# Patient Record
Sex: FEMALE
[De-identification: names, ages, dates, MRNs, and addresses within clinical notes are randomized per-mention and may not be internally consistent; named-entity substitution may affect disease eponyms.]

---

## 2020-05-18 ENCOUNTER — HOSPITAL ENCOUNTER (OUTPATIENT)
Dept: HOSPITAL 62 - OROUT | Age: 25
Discharge: HOME | End: 2020-05-18
Attending: INTERNAL MEDICINE
Payer: OTHER GOVERNMENT

## 2020-05-18 VITALS — SYSTOLIC BLOOD PRESSURE: 130 MMHG | DIASTOLIC BLOOD PRESSURE: 81 MMHG

## 2020-05-18 DIAGNOSIS — K64.8: ICD-10-CM

## 2020-05-18 DIAGNOSIS — K52.9: ICD-10-CM

## 2020-05-18 DIAGNOSIS — K92.1: Primary | ICD-10-CM

## 2020-05-18 DIAGNOSIS — Z79.899: ICD-10-CM

## 2020-05-18 PROCEDURE — 00811 ANES LWR INTST NDSC NOS: CPT

## 2020-05-18 PROCEDURE — 88305 TISSUE EXAM BY PATHOLOGIST: CPT

## 2020-05-18 PROCEDURE — 45380 COLONOSCOPY AND BIOPSY: CPT

## 2020-05-18 NOTE — OPERATIVE REPORT
Operative Report


DATE OF SURGERY: 05/18/20


Operative Report: 





The risk, benefits and alternatives of the procedure including the risk of 

bleeding, perforation requiring surgery have been explained to the patient in 

detail and informed consent has been obtained.  The patient is placed in a left,

lateral decubital position.  Timeout was called.  Propofol medication is 

administered.  Rectal examination is done which did not reveal any masses, tears

or fissures.  An Olympus videoscope was inserted into the patient's rectum.  

Scope was then carefully advanced all the way to the cecum.  Cecum was 

identified by the usual anatomical landmarks including the ileocecal valve as 

well as the appendiceal office.  Photodocumentation is obtained.  Scope was then

sequentially pulled back via the various segments of the colon including the 

ascending colon, hepatic flexure, transverse colon, splenic flexure, descending 

colon finding to the rectosigmoid portions of the colon.  Retroflexion maneuver 

is performed.


Intubation of the terminal ileum is done.


PREOPERATIVE DIAGNOSIS: Rectal bleeding


POSTOPERATIVE DIAGNOSIS: Internal hemorrhoids.  Right side colon Inflammation 

status post biopsy.  Normal terminal ileum


OPERATION: Colonoscopy with biopsy


SURGEON: NATHANAEL KYLE


ANESTHESIA: LMAC


TISSUE REMOVED OR ALTERED: As noted above.


COMPLICATIONS: 





None.


ESTIMATED BLOOD LOSS: None.


INTRAOPERATIVE FINDINGS: As noted above.


PROCEDURE: 





Patient tolerated the procedure well.


No immediate postprocedure complications are noted.


Patient is discharged in good condition.


Discharge date 5/18/2020.


Discharge diet: Regular.


Discharge activity: Regular.  2 to 3-week follow-up to discuss findings.





Patient is instructed to call the office or proceed to the emergency room should

there be any further problems or questions.


Wait on the pathology.

## 2020-10-21 ENCOUNTER — HOSPITAL ENCOUNTER (OUTPATIENT)
Dept: HOSPITAL 62 - OD | Age: 25
End: 2020-10-21
Attending: OTOLARYNGOLOGY
Payer: COMMERCIAL

## 2020-10-21 DIAGNOSIS — Z03.818: Primary | ICD-10-CM

## 2020-10-21 DIAGNOSIS — J34.2: ICD-10-CM

## 2020-10-21 PROCEDURE — C9803 HOPD COVID-19 SPEC COLLECT: HCPCS

## 2020-10-21 PROCEDURE — 87635 SARS-COV-2 COVID-19 AMP PRB: CPT

## 2021-01-18 ENCOUNTER — HOSPITAL ENCOUNTER (OUTPATIENT)
Dept: HOSPITAL 62 - SC | Age: 26
Discharge: HOME | End: 2021-01-18
Attending: OTOLARYNGOLOGY
Payer: COMMERCIAL

## 2021-01-18 DIAGNOSIS — J32.9: ICD-10-CM

## 2021-01-18 DIAGNOSIS — J30.9: ICD-10-CM

## 2021-01-18 DIAGNOSIS — Z01.812: ICD-10-CM

## 2021-01-18 DIAGNOSIS — J34.3: ICD-10-CM

## 2021-01-18 DIAGNOSIS — Z20.822: ICD-10-CM

## 2021-01-18 DIAGNOSIS — R06.09: ICD-10-CM

## 2021-01-18 DIAGNOSIS — M95.0: ICD-10-CM

## 2021-01-18 DIAGNOSIS — J34.2: Primary | ICD-10-CM

## 2021-01-18 PROCEDURE — 30420 RECONSTRUCTION OF NOSE: CPT

## 2021-01-18 PROCEDURE — 00160 ANES PX NOSE&SINUS NOS: CPT

## 2021-01-18 PROCEDURE — C1758 CATHETER, URETERAL: HCPCS

## 2021-01-18 PROCEDURE — 20912 REMOVE CARTILAGE FOR GRAFT: CPT

## 2021-01-18 PROCEDURE — 87635 SARS-COV-2 COVID-19 AMP PRB: CPT

## 2021-01-18 PROCEDURE — C9803 HOPD COVID-19 SPEC COLLECT: HCPCS

## 2021-01-18 PROCEDURE — S0028 INJECTION, FAMOTIDINE, 20 MG: HCPCS

## 2021-01-18 PROCEDURE — C1769 GUIDE WIRE: HCPCS

## 2021-01-18 PROCEDURE — 30140 RESECT INFERIOR TURBINATE: CPT

## 2021-01-18 RX ADMIN — BALANCED SALT SOLUTION ONE ML: 6.4; .75; .48; .3; 3.9; 1.7 SOLUTION OPHTHALMIC at 14:55

## 2021-01-18 RX ADMIN — TOBRAMYCIN AND DEXAMETHASONE ONE APPLIC: 3; 1 OINTMENT OPHTHALMIC at 10:35

## 2021-01-18 RX ADMIN — TOBRAMYCIN AND DEXAMETHASONE ONE APPLIC: 3; 1 OINTMENT OPHTHALMIC at 10:32

## 2021-01-18 RX ADMIN — BALANCED SALT SOLUTION ONE ML: 6.4; .75; .48; .3; 3.9; 1.7 SOLUTION OPHTHALMIC at 13:47

## 2021-01-21 NOTE — OPERATIVE REPORT
Operative Report-[facility]


Operative Report: 


DATE OF OPERATION: January 18, 2021





PREOPERATIVE DIAGNOSES:


1.   Nasal septal deviation, Acquired


2.   Bilateral inferior turbinate hypertrophy


3.   Nasal Deformities, Acquired


4.   Chronic Nasal Dyspnea


5.   Bilateral nasal valve collapse/deficiency/stenosis


6.   History of nasal trauma





POSTOPERATIVE DIAGNOSES:


1.   Nasal septal deviation, Acquired


2.   Bilateral inferior turbinate hypertrophy


3.   Nasal Deformities, Acquired


4.   Chronic Nasal Dyspnea


5.   Bilateral nasal valve collapse/deficiency/stenosis


6.   History of nasal trauma





PROCEDURES:


1.   Rhinoplasty consisting of bilateral nasal valve repair with extensive 

cartilage grafts


2.   Extensive septoplasty with major septal repair with cartilage grafting


3..   Bilateral intramural inferior turbinate reductions using submucus 

resection techniques





SURGEON: Dr. Irineo Yoder


Anesthesia Staff: NAVA Ceballos


ANESTHESIA: General endotracheal tube anesthesia/GETA


DRAINS: None


SPONGE COUNT: Verified


NEEDLE COUNT: Verified


SPECIMEN/MATERIALS FORWARD TO THE LAB: None


ESTIMATED BLOOD LOSS: 100 mL


TOTAL IV FLUIDS: 1850 mL


URINE OUTPUT: 875 mL


COMPLICATIONS: None





FINDINGS:


1.   Nasal septal deviation involving bone and cartilage with severe caudal 

septal cartilage damage with fractures and the septum turned out a right angle 

into the left nasal passage with near complete obstruction.


2.   Bilateral nasal valve collapse with compression/narrowing of the nasal 

passages.


3.   Dorsal irregularities involving bone and cartilage.


4.   Bilateral inferior turbinate hypertrophy in the appearance of a left jurgen

bullosa.





INDICATIONS: This is a 25-year-old white female patient who was seen and 

evaluated in the [place] [facility] office. The patient was referred for and 

they complained of a history of chronic nasal dyspnea over the years and history

of nasal trauma.  The patient clinically is noted to have severe nasal septal 

deviations, bilateral turbinate hypertrophy, lateral nasal valve 

collapse/deficiencies, and nasal deformities.  The patient has desired to 

undergo nasal surgery to improve functional nasal airflow and overall quality of

life. The procedure/surgery, and all of the risks and complications were all 

discussed in detail with the patient.  She voiced an understanding, agreed to 

proceed, and consent was obtained.





DESCRIPTION OF OPERATIVE PROCEDURE: The patient was taken to the main operating 

room and placed on the operating room table in the supine position. Appropriate 

monitors were placed. Using mask and IV access general anesthesia was induced. 

The patient was then transorally intubated without difficulty. The table was 

next positioned for nasal surgery. The patient underwent a nasal examination and

local anesthetic with epinephrine was administered to establish a nasal block. 

The patient next had two Afrin soaked neuropatties placed into each nasal 

passage. The patient was then prepped and draped in the usual fashion for nasal 

surgery. The neuropatties were removed and the patient underwent a 

hemitransfixion incision. There was elevation of the mucoperichondrial and 

mucoperiosteal flaps which was very difficult due to severe cartilage 

deformities and thin tenuous overlying mucosa with scattered small rents 

resulting. The bony cartilaginous junction was identified and divided and the 

most deviated portions of the bony and cartilaginous septum were removed without

difficulty.  The fractured caudal septal cartilage was mobilized with fracture 

lines isolated and divided with damaged cartilage removed.  The anterior nasal 

spine was excessively prominent and a rongeur was used to modify this area.  

There was a greater than 1.5 X 1.5 cm cartilaginous L-Strut preserved.





Attention was now turned to performing bilateral inferior turbinate reductions. 

The turbinate bipolar wand was used to make 2 - 3 intramural passes in each 

inferior turbinate. At this point the turbinate microdebrider system at a 

setting of 1500 RPM was used to perform bilateral inferior turbinate submucus 

resections. This was followed by use of the Pop elevator to outfracture each 

inferior turbinate.  Excessive/redundant mucosa at the anterior portion of the 

inferior turbinates was next trimmed with margins reapproximated with chromic 

suture.





At this point there were modified marginal incisions created bilateral followed 

by skin and soft tissue envelope over the nasal dorsum and precise pockets were 

created for the extended bilateral alar batten cartilage grafts.  A dorsal rasp 

and modified 11 blade scalpel were used to address the dorsal irregularities 

consisting of bone and cartilage. At this point septal cartilage was used and 

fashioned into an extended robust caudal septal graft that was secured at the 

right caudal septum and secured in place with 5-0 Prolene suture. At this point 

the septal cartilage with graft was secured at the anterior nasal spine using 5-

0 clear nylon suture. Next, additional septal cartilage was fashioned into 2 

robust extended alar batten grafts which were inserted into the left and right 

precise pockets in the nasal valve areas. At this point the nose was thoroughly 

irrigated and suctioned with reasonable hemostasis being noted. At additional 

septal cartilage was placed back between the mucosal flaps and banked. Mucosal 

rents were reapproximated with chromic suture. All incisions were reapproximated

with chromic suture. This was followed by placement of Telfa bolsters inside and

outside of the nose with through and through 5-0 Prolene suture to secure the 

alar batten grafts in place.





This was followed by placement of intranasal supporting material consisting of 

modified Merocel packing one in each nasal passage that were secured at the 

caudal aspect with 4-0 Prolene suture. The nose was then cleaned and dried 

followed by placement of Mastisol and Steri-Strips over the nasal dorsum. Next, 

the patient was returned to the anesthesia staff and was allowed to emerge from 

general anesthesia. The patient was extubated in the main operating room and was

then transported to the postanesthesia recovery unit in stable condition. There 

were no complications.